# Patient Record
(demographics unavailable — no encounter records)

---

## 2025-03-25 NOTE — HEALTH RISK ASSESSMENT
[Yes] : Yes [2 - 3 times a week (3 pts)] : 2 - 3  times a week (3 points) [3 or 4 (1 pt)] : 3 or 4  (1 point) [Never (0 pts)] : Never (0 points) [0] : 2) Feeling down, depressed, or hopeless: Not at all (0) [PHQ-2 Negative - No further assessment needed] : PHQ-2 Negative - No further assessment needed [Patient reported PAP Smear was abnormal] : Patient reported PAP Smear was abnormal [HIV test declined] : HIV test declined [Current] : Current [NO] : No [PapSmearComments] : h/o colposcopy and currently normal [de-identified] : maxwell

## 2025-03-25 NOTE — PHYSICAL EXAM
[No Acute Distress] : no acute distress [Well-Appearing] : well-appearing [Coordination Grossly Intact] : coordination grossly intact [Normal Gait] : normal gait [Normal] : no rash

## 2025-03-25 NOTE — PLAN
[FreeTextEntry1] : #Unintentional Weight Loss - Check labs including CBC, CMP, A1C, TSH, ESR, CRP - CXR - Monitoring of weight  #Interstitial Cystitis - UA with reflex to UCx  - Can refer to Urology at any time  #HM - Bloodwork today - Vaccine appt for tdap - Get records

## 2025-03-25 NOTE — ASSESSMENT
[FreeTextEntry1] : #Unintentional Weight Loss Pt does not regularly weigh herself but shares 20 lb weight loss over a few months without any changes to her eating  or activity. Pt taking topiramate and bupropion which can lead to weight loss, however must r/o other etiologies and malignancy. We discussed starting a workup and closely tracking her weight.  #Interstitial Cystitis, We discussed checking a urine today, pelvic floor PT, possible referral to Dr. Longo. Currently not experiencing flare-ups.   #HM Due for tdap Likely UTD on pap Completed gardasil Does not need STI screening today Okay with bloodwork today Ok with Sofia

## 2025-03-25 NOTE — HISTORY OF PRESENT ILLNESS
[de-identified] : MICAELA LEWIS is a 30 year y/o F with PMH of [place] syndrome, anxiety, depression, interstitial cystitis, migraines who presents as a new patient today to establish care. CC annual physical, urinary complaints, unintentional weight loss.  #Interstitial Cystitis Has seen a urologist without resolution "Weird" smelling urine Has urgency increased at night Most recently notes severe weight loss Still currently having symptoms, no flare-ups recently Was bad in January and February, was occurring q3d around 7pm Feels like pressure on her bladder Interested in referral to Dr. Longo  #Weight Loss Lost 20 lbs over 3 months Doesn't feel totally herself recently No change in physical activity (pt does not exercise), no change in diet Shares complicated history with eating and her weight, usually tries to avoid weighing herself but has noted clothes feeling too big and people around her noticing her weight loss.   PMH: urinary complaints as above diagnosed as interstitial cystitis, migraines in the past caused by OCPs, has been having migraines recently despite being off birth control. Junaid Syndrome. Anxiety and depression PSH: 2013 reconstructive surgery of L pectoral muscle, wisdom teeth Allergies: minocycline (skin reaction) Medications: Bupropion 300 mg daily prescribed by Psych, topiramate 50 mg BID previously for migraines and now for mental health. Has been on bupropion for about 1 year+. No v/s, previously was taking demonos for urinary sx Fam Hx: cardiac Social History: Lives with partner Griffin, just moved in together this past week Works in fashion as apparel  Tobacco use: vape nicotine Alcohol use: weekly, usually 3 drinks on a weekend night Drug use: occasionally marijuana Sexually active: Y, doesn't need testing today Diet & Exercise: no Mood: fine, anxiety is high right now, has psych and therapist, no SI/HI Firearms: N  #Health Maintenance Tdap: not sure, maybe due, will schedule Gardasil: completed Pap: h/o HPV, s/p colposcopy, since normal, last done 2023, follows with Gynecologist STI screen: not today Family Planning: regular periods 26 day cycle, light, 4 days. Not on birth control currently.

## 2025-03-25 NOTE — HISTORY OF PRESENT ILLNESS
[de-identified] : MICAELA LEWIS is a 30 year y/o F with PMH of [place] syndrome, anxiety, depression, interstitial cystitis, migraines who presents as a new patient today to establish care. CC annual physical, urinary complaints, unintentional weight loss.  #Interstitial Cystitis Has seen a urologist without resolution "Weird" smelling urine Has urgency increased at night Most recently notes severe weight loss Still currently having symptoms, no flare-ups recently Was bad in January and February, was occurring q3d around 7pm Feels like pressure on her bladder Interested in referral to Dr. Longo  #Weight Loss Lost 20 lbs over 3 months Doesn't feel totally herself recently No change in physical activity (pt does not exercise), no change in diet Shares complicated history with eating and her weight, usually tries to avoid weighing herself but has noted clothes feeling too big and people around her noticing her weight loss.   PMH: urinary complaints as above diagnosed as interstitial cystitis, migraines in the past caused by OCPs, has been having migraines recently despite being off birth control. Junaid Syndrome. Anxiety and depression PSH: 2013 reconstructive surgery of L pectoral muscle, wisdom teeth Allergies: minocycline (skin reaction) Medications: Bupropion 300 mg daily prescribed by Psych, topiramate 50 mg BID previously for migraines and now for mental health. Has been on bupropion for about 1 year+. No v/s, previously was taking demonos for urinary sx Fam Hx: cardiac Social History: Lives with partner Griffin, just moved in together this past week Works in fashion as apparel  Tobacco use: vape nicotine Alcohol use: weekly, usually 3 drinks on a weekend night Drug use: occasionally marijuana Sexually active: Y, doesn't need testing today Diet & Exercise: no Mood: fine, anxiety is high right now, has psych and therapist, no SI/HI Firearms: N  #Health Maintenance Tdap: not sure, maybe due, will schedule Gardasil: completed Pap: h/o HPV, s/p colposcopy, since normal, last done 2023, follows with Gynecologist STI screen: not today Family Planning: regular periods 26 day cycle, light, 4 days. Not on birth control currently.

## 2025-03-25 NOTE — HEALTH RISK ASSESSMENT
[Yes] : Yes [2 - 3 times a week (3 pts)] : 2 - 3  times a week (3 points) [3 or 4 (1 pt)] : 3 or 4  (1 point) [Never (0 pts)] : Never (0 points) [0] : 2) Feeling down, depressed, or hopeless: Not at all (0) [PHQ-2 Negative - No further assessment needed] : PHQ-2 Negative - No further assessment needed [Patient reported PAP Smear was abnormal] : Patient reported PAP Smear was abnormal [HIV test declined] : HIV test declined [Current] : Current [NO] : No [PapSmearComments] : h/o colposcopy and currently normal [de-identified] : maxwell